# Patient Record
Sex: FEMALE | Race: WHITE | NOT HISPANIC OR LATINO | Employment: OTHER | URBAN - METROPOLITAN AREA
[De-identification: names, ages, dates, MRNs, and addresses within clinical notes are randomized per-mention and may not be internally consistent; named-entity substitution may affect disease eponyms.]

---

## 2024-02-20 ENCOUNTER — OFFICE VISIT (OUTPATIENT)
Dept: OBSTETRICS AND GYNECOLOGY | Facility: CLINIC | Age: 44
End: 2024-02-20
Payer: COMMERCIAL

## 2024-02-20 VITALS
HEIGHT: 67 IN | SYSTOLIC BLOOD PRESSURE: 110 MMHG | BODY MASS INDEX: 21.88 KG/M2 | DIASTOLIC BLOOD PRESSURE: 70 MMHG | WEIGHT: 139.38 LBS

## 2024-02-20 DIAGNOSIS — F32.81 PMDD (PREMENSTRUAL DYSPHORIC DISORDER): Primary | ICD-10-CM

## 2024-02-20 PROCEDURE — 99203 OFFICE O/P NEW LOW 30 MIN: CPT | Performed by: NURSE PRACTITIONER

## 2024-02-20 PROCEDURE — 1036F TOBACCO NON-USER: CPT | Performed by: NURSE PRACTITIONER

## 2024-02-20 ASSESSMENT — ENCOUNTER SYMPTOMS
NEUROLOGICAL NEGATIVE: 0
ENDOCRINE NEGATIVE: 0
HEMATOLOGIC/LYMPHATIC NEGATIVE: 0
RESPIRATORY NEGATIVE: 0
PSYCHIATRIC NEGATIVE: 0
MUSCULOSKELETAL NEGATIVE: 0
CONSTITUTIONAL NEGATIVE: 0
EYES NEGATIVE: 0
ALLERGIC/IMMUNOLOGIC NEGATIVE: 0
CARDIOVASCULAR NEGATIVE: 0
GASTROINTESTINAL NEGATIVE: 0

## 2024-02-20 ASSESSMENT — PAIN SCALES - GENERAL: PAINLEVEL: 0-NO PAIN

## 2024-02-20 NOTE — PROGRESS NOTES
Subjective   Patient ID: Anabella Briscoe is a 43 y.o. female who presents for No chief complaint on file..  HPI  H/o twin pregnancy 2020  Breast fed for one year  1/2022 menses resumed  2022 trauma event, PTSD symptoms started 7/2022; around the same time started to have AUB-q 24 days instead of Q 28 days  Developed PMDD summer of 2022; started on zoloft, helped with the most extreme symptoms  After one year, moods improved but she continues to have PMDD  Thyroid testing was normal   Stopped zoloft 7/2023; overall symptoms are better; continues to have anxiety and nausea in the morning  Day of her first menses symptoms of anxiety, lasts 2 days  Contraception: none    VMS: none  Brain fog: yes  Joint pain: none    H/o hashimotos  Only medication she is currently on is low dose naltrexone    Review of Systems    Objective   Physical Exam    Assessment/Plan   Diagnoses and all orders for this visit:  PMDD (premenstrual dysphoric disorder)    Options discussed included: zoloft only during luteal phase; we also discussed MHT dosing due to its efficacy with perimenopausal mood changes including PMS/PMDD  Pt declined for now, feels her moods are improving  Supplement from [facility] discussed  Follow up MARCEL Wilson, CELENA-CNP 02/20/24 11:05 AM